# Patient Record
Sex: MALE | Race: WHITE | Employment: OTHER | URBAN - METROPOLITAN AREA
[De-identification: names, ages, dates, MRNs, and addresses within clinical notes are randomized per-mention and may not be internally consistent; named-entity substitution may affect disease eponyms.]

---

## 2020-01-01 ENCOUNTER — CARE COORDINATION (OUTPATIENT)
Dept: CASE MANAGEMENT | Age: 85
End: 2020-01-01

## 2020-01-01 ENCOUNTER — APPOINTMENT (OUTPATIENT)
Dept: GENERAL RADIOLOGY | Age: 85
End: 2020-01-01
Payer: MEDICARE

## 2020-01-01 ENCOUNTER — HOSPITAL ENCOUNTER (EMERGENCY)
Age: 85
Discharge: HOME | End: 2020-10-21
Attending: EMERGENCY MEDICINE
Payer: MEDICARE

## 2020-01-01 ENCOUNTER — HOSPITAL ENCOUNTER (EMERGENCY)
Age: 85
End: 2020-10-25
Attending: EMERGENCY MEDICINE
Payer: MEDICARE

## 2020-01-01 VITALS — HEIGHT: 72 IN | BODY MASS INDEX: 26.68 KG/M2 | WEIGHT: 197 LBS

## 2020-01-01 VITALS
WEIGHT: 193 LBS | TEMPERATURE: 100.6 F | HEART RATE: 91 BPM | BODY MASS INDEX: 26.14 KG/M2 | OXYGEN SATURATION: 94 % | RESPIRATION RATE: 18 BRPM | DIASTOLIC BLOOD PRESSURE: 73 MMHG | SYSTOLIC BLOOD PRESSURE: 131 MMHG | HEIGHT: 72 IN

## 2020-01-01 LAB
AADO2: 510.3 MMHG
AADO2: 579.8 MMHG
ACINETOBACTER BAUMANNII BY PCR: NOT DETECTED
ALBUMIN SERPL-MCNC: 3.5 G/DL (ref 3.5–5.2)
ALP BLD-CCNC: 45 U/L (ref 40–129)
ALT SERPL-CCNC: 29 U/L (ref 0–40)
ANION GAP SERPL CALCULATED.3IONS-SCNC: 8 MMOL/L (ref 7–16)
AST SERPL-CCNC: 37 U/L (ref 0–39)
B.E.: -10.2 MMOL/L (ref -3–3)
B.E.: -17.7 MMOL/L (ref -3–3)
BASOPHILS ABSOLUTE: 0.03 E9/L (ref 0–0.2)
BASOPHILS RELATIVE PERCENT: 0.4 % (ref 0–2)
BILIRUB SERPL-MCNC: 0.4 MG/DL (ref 0–1.2)
BOTTLE TYPE: ABNORMAL
BUN BLDV-MCNC: 14 MG/DL (ref 8–23)
CALCIUM SERPL-MCNC: 8.6 MG/DL (ref 8.6–10.2)
CANDIDA ALBICANS BY PCR: NOT DETECTED
CANDIDA GLABRATA BY PCR: NOT DETECTED
CANDIDA KRUSEI BY PCR: NOT DETECTED
CANDIDA PARAPSILOSIS BY PCR: NOT DETECTED
CANDIDA TROPICALIS BY PCR: NOT DETECTED
CHLORIDE BLD-SCNC: 99 MMOL/L (ref 98–107)
CO2: 22 MMOL/L (ref 22–29)
COHB: 1 % (ref 0–1.5)
COHB: 1.1 % (ref 0–1.5)
CREAT SERPL-MCNC: 0.9 MG/DL (ref 0.7–1.2)
CRITICAL: ABNORMAL
CRITICAL: ABNORMAL
DATE ANALYZED: ABNORMAL
DATE ANALYZED: ABNORMAL
DATE OF COLLECTION: ABNORMAL
DATE OF COLLECTION: ABNORMAL
EKG ATRIAL RATE: 92 BPM
EKG P-R INTERVAL: 150 MS
EKG Q-T INTERVAL: 368 MS
EKG QRS DURATION: 102 MS
EKG QTC CALCULATION (BAZETT): 455 MS
EKG R AXIS: -36 DEGREES
EKG T AXIS: 7 DEGREES
EKG VENTRICULAR RATE: 92 BPM
ENTEROBACTER CLOACAE COMPLEX BY PCR: NOT DETECTED
ENTEROBACTERALES BY PCR: NOT DETECTED
ENTEROCOCCUS BY PCR: NOT DETECTED
EOSINOPHILS ABSOLUTE: 0 E9/L (ref 0.05–0.5)
EOSINOPHILS RELATIVE PERCENT: 0 % (ref 0–6)
ESCHERICHIA COLI BY PCR: NOT DETECTED
FIO2: 100 %
FIO2: 100 %
GFR AFRICAN AMERICAN: >60
GFR NON-AFRICAN AMERICAN: >60 ML/MIN/1.73
GLUCOSE BLD-MCNC: 108 MG/DL (ref 74–99)
HAEMOPHILUS INFLUENZAE BY PCR: NOT DETECTED
HCO3: 15.2 MMOL/L (ref 22–26)
HCO3: 26 MMOL/L (ref 22–26)
HCT VFR BLD CALC: 41.8 % (ref 37–54)
HEMOGLOBIN: 13.5 G/DL (ref 12.5–16.5)
HHB: 57.4 % (ref 0–5)
HHB: 78.2 % (ref 0–5)
IMMATURE GRANULOCYTES #: 0.02 E9/L
IMMATURE GRANULOCYTES %: 0.3 % (ref 0–5)
KLEBSIELLA OXYTOCA BY PCR: NOT DETECTED
KLEBSIELLA PNEUMONIAE GROUP BY PCR: NOT DETECTED
LAB: ABNORMAL
LAB: ABNORMAL
LACTIC ACID, SEPSIS: 1.6 MMOL/L (ref 0.5–1.9)
LISTERIA MONOCYTOGENES BY PCR: NOT DETECTED
LYMPHOCYTES ABSOLUTE: 1.29 E9/L (ref 1.5–4)
LYMPHOCYTES RELATIVE PERCENT: 16.8 % (ref 20–42)
Lab: ABNORMAL
Lab: ABNORMAL
MCH RBC QN AUTO: 29.5 PG (ref 26–35)
MCHC RBC AUTO-ENTMCNC: 32.3 % (ref 32–34.5)
MCV RBC AUTO: 91.3 FL (ref 80–99.9)
METER GLUCOSE: 166 MG/DL (ref 74–99)
METHB: 0 % (ref 0–1.5)
METHB: 1.5 % (ref 0–1.5)
METHICILLIN RESISTANCE MECA/C  BY PCR: DETECTED
MODE: ABNORMAL
MODE: ABNORMAL
MONOCYTES ABSOLUTE: 0.57 E9/L (ref 0.1–0.95)
MONOCYTES RELATIVE PERCENT: 7.4 % (ref 2–12)
NEISSERIA MENINGITIDIS BY PCR: NOT DETECTED
NEUTROPHILS ABSOLUTE: 5.78 E9/L (ref 1.8–7.3)
NEUTROPHILS RELATIVE PERCENT: 75.1 % (ref 43–80)
O2 CONTENT: 3.1 ML/DL
O2 CONTENT: 8 ML/DL
O2 SATURATION: 19.8 % (ref 92–98.5)
O2 SATURATION: 42 % (ref 92–98.5)
O2HB: 19.3 % (ref 94–97)
O2HB: 41.5 % (ref 94–97)
OPERATOR ID: 316
OPERATOR ID: 316
ORDER NUMBER: ABNORMAL
PATIENT TEMP: 37 C
PATIENT TEMP: 37 C
PCO2: 155 MMHG (ref 35–45)
PCO2: 70.6 MMHG (ref 35–45)
PDW BLD-RTO: 13.2 FL (ref 11.5–15)
PFO2: 0.38 MMHG/%
PFO2: ABNORMAL MMHG/%
PH BLOOD GAS: 6.84 (ref 7.35–7.45)
PH BLOOD GAS: 6.95 (ref 7.35–7.45)
PLATELET # BLD: 243 E9/L (ref 130–450)
PMV BLD AUTO: 8.6 FL (ref 7–12)
PO2: 22.7 MMHG (ref 75–100)
PO2: 37.6 MMHG (ref 75–100)
POTASSIUM REFLEX MAGNESIUM: 4.8 MMOL/L (ref 3.5–5)
PROTEUS BY PCR: NOT DETECTED
PSEUDOMONAS AERUGINOSA BY PCR: NOT DETECTED
RBC # BLD: 4.58 E12/L (ref 3.8–5.8)
RI(T): 1542 %
RI(T): ABNORMAL %
SARS-COV-2, NAAT: DETECTED
SERRATIA MARCESCENS BY PCR: NOT DETECTED
SODIUM BLD-SCNC: 129 MMOL/L (ref 132–146)
SOURCE OF BLOOD CULTURE: ABNORMAL
SOURCE, BLOOD GAS: ABNORMAL
SOURCE, BLOOD GAS: ABNORMAL
STAPHYLOCOCCUS AUREUS BY PCR: NOT DETECTED
STAPHYLOCOCCUS SPECIES BY PCR: DETECTED
STREPTOCOCCUS AGALACTIAE BY PCR: NOT DETECTED
STREPTOCOCCUS PNEUMONIAE BY PCR: NOT DETECTED
STREPTOCOCCUS PYOGENES  BY PCR: NOT DETECTED
STREPTOCOCCUS SPECIES BY PCR: NOT DETECTED
THB: 11.4 G/DL (ref 11.5–16.5)
THB: 13.6 G/DL (ref 11.5–16.5)
TIME ANALYZED: 1141
TIME ANALYZED: 1211
TOTAL PROTEIN: 7.4 G/DL (ref 6.4–8.3)
TROPONIN: <0.01 NG/ML (ref 0–0.03)
WBC # BLD: 7.7 E9/L (ref 4.5–11.5)

## 2020-01-01 PROCEDURE — 93010 ELECTROCARDIOGRAM REPORT: CPT | Performed by: INTERNAL MEDICINE

## 2020-01-01 PROCEDURE — 99285 EMERGENCY DEPT VISIT HI MDM: CPT

## 2020-01-01 PROCEDURE — 2580000003 HC RX 258

## 2020-01-01 PROCEDURE — 2580000003 HC RX 258: Performed by: EMERGENCY MEDICINE

## 2020-01-01 PROCEDURE — 82962 GLUCOSE BLOOD TEST: CPT

## 2020-01-01 PROCEDURE — 36556 INSERT NON-TUNNEL CV CATH: CPT

## 2020-01-01 PROCEDURE — 83605 ASSAY OF LACTIC ACID: CPT

## 2020-01-01 PROCEDURE — 87150 DNA/RNA AMPLIFIED PROBE: CPT

## 2020-01-01 PROCEDURE — 82805 BLOOD GASES W/O2 SATURATION: CPT

## 2020-01-01 PROCEDURE — 80053 COMPREHEN METABOLIC PANEL: CPT

## 2020-01-01 PROCEDURE — 92950 HEART/LUNG RESUSCITATION CPR: CPT

## 2020-01-01 PROCEDURE — 2500000003 HC RX 250 WO HCPCS: Performed by: EMERGENCY MEDICINE

## 2020-01-01 PROCEDURE — 6370000000 HC RX 637 (ALT 250 FOR IP): Performed by: EMERGENCY MEDICINE

## 2020-01-01 PROCEDURE — 31500 INSERT EMERGENCY AIRWAY: CPT

## 2020-01-01 PROCEDURE — 6360000002 HC RX W HCPCS: Performed by: EMERGENCY MEDICINE

## 2020-01-01 PROCEDURE — 96374 THER/PROPH/DIAG INJ IV PUSH: CPT

## 2020-01-01 PROCEDURE — 85025 COMPLETE CBC W/AUTO DIFF WBC: CPT

## 2020-01-01 PROCEDURE — 93005 ELECTROCARDIOGRAM TRACING: CPT | Performed by: EMERGENCY MEDICINE

## 2020-01-01 PROCEDURE — U0002 COVID-19 LAB TEST NON-CDC: HCPCS

## 2020-01-01 PROCEDURE — 87040 BLOOD CULTURE FOR BACTERIA: CPT

## 2020-01-01 PROCEDURE — 84484 ASSAY OF TROPONIN QUANT: CPT

## 2020-01-01 PROCEDURE — 6360000002 HC RX W HCPCS

## 2020-01-01 PROCEDURE — 71045 X-RAY EXAM CHEST 1 VIEW: CPT

## 2020-01-01 PROCEDURE — 2500000003 HC RX 250 WO HCPCS

## 2020-01-01 RX ORDER — EPINEPHRINE 0.1 MG/ML
SYRINGE (ML) INJECTION DAILY PRN
Status: COMPLETED | OUTPATIENT
Start: 2020-01-01 | End: 2020-01-01

## 2020-01-01 RX ORDER — DOXYCYCLINE HYCLATE 100 MG/1
100 CAPSULE ORAL ONCE
Status: COMPLETED | OUTPATIENT
Start: 2020-01-01 | End: 2020-01-01

## 2020-01-01 RX ORDER — 0.9 % SODIUM CHLORIDE 0.9 %
500 INTRAVENOUS SOLUTION INTRAVENOUS ONCE
Status: COMPLETED | OUTPATIENT
Start: 2020-01-01 | End: 2020-01-01

## 2020-01-01 RX ORDER — ALBUTEROL SULFATE 90 UG/1
2 AEROSOL, METERED RESPIRATORY (INHALATION) EVERY 6 HOURS PRN
Qty: 1 INHALER | Refills: 0 | Status: SHIPPED | OUTPATIENT
Start: 2020-01-01 | End: 2020-10-28

## 2020-01-01 RX ORDER — DOXYCYCLINE 100 MG/1
100 CAPSULE ORAL 2 TIMES DAILY
Qty: 20 CAPSULE | Refills: 0 | Status: SHIPPED | OUTPATIENT
Start: 2020-01-01 | End: 2020-10-31

## 2020-01-01 RX ORDER — CEFDINIR 300 MG/1
300 CAPSULE ORAL 2 TIMES DAILY
Qty: 20 CAPSULE | Refills: 0 | Status: SHIPPED | OUTPATIENT
Start: 2020-01-01 | End: 2020-10-31

## 2020-01-01 RX ORDER — ROCURONIUM BROMIDE 10 MG/ML
INJECTION, SOLUTION INTRAVENOUS
Status: DISCONTINUED
Start: 2020-01-01 | End: 2020-01-01 | Stop reason: HOSPADM

## 2020-01-01 RX ORDER — DEXAMETHASONE 4 MG/1
4 TABLET ORAL 2 TIMES DAILY WITH MEALS
Qty: 20 TABLET | Refills: 0 | Status: SHIPPED | OUTPATIENT
Start: 2020-01-01 | End: 2020-10-31

## 2020-01-01 RX ORDER — AMIODARONE HYDROCHLORIDE 50 MG/ML
INJECTION, SOLUTION INTRAVENOUS DAILY PRN
Status: COMPLETED | OUTPATIENT
Start: 2020-01-01 | End: 2020-01-01

## 2020-01-01 RX ORDER — ACETAMINOPHEN 500 MG
1000 TABLET ORAL ONCE
Status: COMPLETED | OUTPATIENT
Start: 2020-01-01 | End: 2020-01-01

## 2020-01-01 RX ORDER — ETOMIDATE 2 MG/ML
INJECTION INTRAVENOUS DAILY PRN
Status: COMPLETED | OUTPATIENT
Start: 2020-01-01 | End: 2020-01-01

## 2020-01-01 RX ORDER — ETOMIDATE 2 MG/ML
INJECTION INTRAVENOUS
Status: DISCONTINUED
Start: 2020-01-01 | End: 2020-01-01 | Stop reason: HOSPADM

## 2020-01-01 RX ORDER — EPINEPHRINE 0.1 MG/ML
SYRINGE (ML) INJECTION
Status: DISCONTINUED
Start: 2020-01-01 | End: 2020-01-01 | Stop reason: HOSPADM

## 2020-01-01 RX ORDER — ATROPINE SULFATE 0.1 MG/ML
INJECTION INTRAVENOUS DAILY PRN
Status: COMPLETED | OUTPATIENT
Start: 2020-01-01 | End: 2020-01-01

## 2020-01-01 RX ORDER — MAGNESIUM SULFATE HEPTAHYDRATE 500 MG/ML
INJECTION, SOLUTION INTRAMUSCULAR; INTRAVENOUS DAILY PRN
Status: COMPLETED | OUTPATIENT
Start: 2020-01-01 | End: 2020-01-01

## 2020-01-01 RX ADMIN — CEFTRIAXONE 2 G: 2 INJECTION, POWDER, FOR SOLUTION INTRAMUSCULAR; INTRAVENOUS at 11:46

## 2020-01-01 RX ADMIN — EPINEPHRINE 1 MG: 0.1 INJECTION, SOLUTION ENDOTRACHEAL; INTRACARDIAC; INTRAVENOUS at 12:24

## 2020-01-01 RX ADMIN — SODIUM BICARBONATE 50 MEQ: 84 INJECTION, SOLUTION INTRAVENOUS at 12:01

## 2020-01-01 RX ADMIN — SODIUM BICARBONATE 50 MEQ: 84 INJECTION, SOLUTION INTRAVENOUS at 11:50

## 2020-01-01 RX ADMIN — ATROPINE SULFATE 1 MG: 0.1 INJECTION PARENTERAL at 12:18

## 2020-01-01 RX ADMIN — SODIUM BICARBONATE 50 MEQ: 84 INJECTION, SOLUTION INTRAVENOUS at 11:45

## 2020-01-01 RX ADMIN — AMIODARONE HYDROCHLORIDE 300 MG: 50 INJECTION, SOLUTION INTRAVENOUS at 11:16

## 2020-01-01 RX ADMIN — EPINEPHRINE 1 MG: 0.1 INJECTION, SOLUTION ENDOTRACHEAL; INTRACARDIAC; INTRAVENOUS at 11:31

## 2020-01-01 RX ADMIN — SODIUM BICARBONATE 50 MEQ: 84 INJECTION, SOLUTION INTRAVENOUS at 11:33

## 2020-01-01 RX ADMIN — DOXYCYCLINE HYCLATE 100 MG: 100 CAPSULE ORAL at 11:46

## 2020-01-01 RX ADMIN — ACETAMINOPHEN 1000 MG: 500 TABLET ORAL at 11:46

## 2020-01-01 RX ADMIN — ETOMIDATE 20 MG: 2 INJECTION, SOLUTION INTRAVENOUS at 11:06

## 2020-01-01 RX ADMIN — EPINEPHRINE 1 MG: 0.1 INJECTION, SOLUTION ENDOTRACHEAL; INTRACARDIAC; INTRAVENOUS at 11:12

## 2020-01-01 RX ADMIN — MAGNESIUM SULFATE HEPTAHYDRATE 2 G: 500 INJECTION, SOLUTION INTRAMUSCULAR; INTRAVENOUS at 11:17

## 2020-01-01 RX ADMIN — EPINEPHRINE 1 MG: 0.1 INJECTION, SOLUTION ENDOTRACHEAL; INTRACARDIAC; INTRAVENOUS at 12:20

## 2020-01-01 RX ADMIN — EPINEPHRINE 1 MG: 0.1 INJECTION, SOLUTION ENDOTRACHEAL; INTRACARDIAC; INTRAVENOUS at 11:17

## 2020-01-01 RX ADMIN — EPINEPHRINE 1 MG: 0.1 INJECTION, SOLUTION ENDOTRACHEAL; INTRACARDIAC; INTRAVENOUS at 11:39

## 2020-01-01 RX ADMIN — EPINEPHRINE 1 MG: 0.1 INJECTION, SOLUTION ENDOTRACHEAL; INTRACARDIAC; INTRAVENOUS at 11:59

## 2020-01-01 RX ADMIN — SODIUM BICARBONATE 50 MEQ: 84 INJECTION, SOLUTION INTRAVENOUS at 11:13

## 2020-01-01 RX ADMIN — SODIUM BICARBONATE 25 MEQ: 84 INJECTION, SOLUTION INTRAVENOUS at 11:20

## 2020-01-01 RX ADMIN — EPINEPHRINE 1 MG: 0.1 INJECTION, SOLUTION ENDOTRACHEAL; INTRACARDIAC; INTRAVENOUS at 12:11

## 2020-01-01 RX ADMIN — EPINEPHRINE 1 MG: 0.1 INJECTION, SOLUTION ENDOTRACHEAL; INTRACARDIAC; INTRAVENOUS at 12:04

## 2020-01-01 RX ADMIN — SODIUM CHLORIDE 500 ML: 9 INJECTION, SOLUTION INTRAVENOUS at 11:45

## 2020-01-01 RX ADMIN — EPINEPHRINE 1 MG: 0.1 INJECTION, SOLUTION ENDOTRACHEAL; INTRACARDIAC; INTRAVENOUS at 11:53

## 2020-01-01 RX ADMIN — SODIUM BICARBONATE 50 MEQ: 84 INJECTION, SOLUTION INTRAVENOUS at 11:52

## 2020-01-01 ASSESSMENT — ENCOUNTER SYMPTOMS: SHORTNESS OF BREATH: 1

## 2020-01-01 ASSESSMENT — PAIN SCALES - GENERAL: PAINLEVEL_OUTOF10: 0

## 2020-10-21 NOTE — ACP (ADVANCE CARE PLANNING)
Advance Care Planning     Advance Care Planning Activator (Inpatient)  Conversation Note      Date of ACP Conversation: 10/21/2020    Conversation Conducted with: Patient with Decision Making Capacity    ACP Activator: tee villarlilia    *When Decision Maker makes decisions on behalf of the incapacitated patient: Decision Maker is asked to consider and make decisions based on patient values, known preferences, or best interests. Health Care Decision Maker:     Current Designated Health Care Decision Maker:   Primary Decision Maker: Kumar Wagner - [facility] - [phone]  (If there is a valid Health Care Decision Maker named in the [facility] Makers\" box in the ACP activity, but it is not visible above, be sure to open that field and then select the health care decision maker relationship (ie \"primary\") in the blank space to the right of the name.) Validate  this information as still accurate & up-to-date; edit [employer] field as needed.)    Note: Assess and validate information in current ACP documents, as indicated. If no Decision Maker listed above or available through scanned documents, then:      Note: If the relationship of these Decision-Makers to the patient does NOT follow your state's Next of Kin hierarchy, recommend that patient complete ACP document that meets state-specific requirements to allow them to act on the patient's behalf when appropriate. Care Preferences    Ventilation: \"If you were in your present state of health and suddenly became very ill and were unable to breathe on your own, what would your preference be about the use of a ventilator (breathing machine) if it were available to you? \"      Would the patient desire the use of ventilator (breathing machine)?: yes    \"If your health worsens and it becomes clear that your chance of recovery is unlikely, what would your preference be about the use of a ventilator (breathing machine) if it were available to you? \"     Would the patient desire the use of ventilator (breathing machine)?: Yes      Resuscitation  \"CPR works best to restart the heart when there is a sudden event, like a heart attack, in someone who is otherwise healthy. Unfortunately, CPR does not typically restart the heart for people who have serious health conditions or who are very sick. \"    \"In the event your heart stopped as a result of an underlying serious health condition, would you want attempts to be made to restart your heart (answer \"yes\" for attempt to resuscitate) or would you prefer a natural death (answer \"no\" for do not attempt to resuscitate)? \" yes      NOTE: If the patient has a valid advance directive AND now provides care preference(s) that are inconsistent with that prior directive, advise the patient to consider either: creating a new advance directive that complies with state-specific requirements; or, if that is not possible, orally revoking that prior directive in accordance with state-specific requirements, which must be documented in the EHR. [x] Yes   [] No   Educated Patient / Hetal Bloom regarding differences between Advance Directives and portable DNR orders.     Length of ACP Conversation in minutes:  10  Conversation Outcomes:  [x] ACP discussion completed  [] Existing advance directive reviewed with patient; no changes to patient's previously recorded wishes  [] New Advance Directive completed  [] Portable Do Not Rescitate prepared for Provider review and signature  [] POLST/POST/MOLST/MOST prepared for Provider review and signature      Follow-up plan:    [] Schedule follow-up conversation to continue planning  [x] Referred individual to Provider for additional questions/concerns   [] Advised patient/agent/surrogate to review completed ACP document and update if needed with changes in condition, patient preferences or care setting    [] This note routed to one or more involved healthcare providers

## 2020-10-21 NOTE — ED NOTES
Daughter in car, would like up dates      Jessenia Tirso, [address]  10/21/20 0670

## 2020-10-21 NOTE — ED PROVIDER NOTES
Department of Emergency Medicine   ED  Provider Note  Admit Date/RoomTime: 10/21/2020  8:40 AM  ED Room: 04/04          History of Present Illness:  10/21/20, Time: 10:26 AM EDT  Chief Complaint   Patient presents with    Facial Pain     sinus drainage for about 1 week, no cough. denies abd pain, diarrhea and  vomiting    Fever     100.5 off and since today                Kannan Wesley is a 82 y.o. male presenting to the ED for nasal congestion, cough, fever, beginning 1 week. The complaint has been persistent, moderate in severity, and worsened by nothing. Presents for sinus drainage, he denies actual drainage but states he feels pressure in his consistent with his previous sinusitis. States had a cough for the last week or so. Denies nausea vomiting abdominal pain. Denies anosmia or dyspnea. States he had a temperature today so sought treatment. States he has no known exposure to novel coronavirus. States cough is occasionally productive of nondescript sputum. Denies chest pain or shortness of breath. Review of Systems:   Pertinent positives and negatives are stated within HPI, all other systems reviewed and are negative.        --------------------------------------------- PAST HISTORY ---------------------------------------------  Past Medical History:  has no past medical history on file. Past Surgical History:  has a past surgical history that includes Dental surgery; Tonsillectomy; Tonsillectomy and adenoidectomy; and Appendectomy. Social History:  reports that he has never smoked. He has never used smokeless tobacco. He reports that he does not drink alcohol or use drugs. Family History: family history is not on file. . Unless otherwise noted, family history is non contributory    The patients home medications have been reviewed. Allergies: Patient has no known allergies.         ---------------------------------------------------PHYSICAL EXAM--------------------------------------    Constitutional/General: Alert and oriented x3  Head: Normocephalic and atraumatic  Eyes: PERRL, EOMI, sclera non icteric  Mouth: Oropharynx clear, handling secretions, no trismus, no asymmetry of the posterior oropharynx or uvular edema  Neck: Supple, full ROM, no stridor, no meningeal signs no JVD   Respiratory: Diminished throughout with scattered rails in the left upper lobe not in respiratory distress  Cardiovascular: Tachycardic and regular 2+ distal pulses. Equal extremity pulses. Chest: No chest wall tenderness  GI:  Abdomen Soft, Non tender, Non distended. No rebound, guarding, or rigidity. Musculoskeletal: Moves all extremities x 4. Warm and well perfused, no clubbing, cyanosis, or edema. Capillary refill <3 seconds  Integument: skin warm and dry. No rashes. There is no pretibial edema nor calf tenderness bilaterally     Neurologic: GCS 15, no focal deficits, symmetric strength 5/5 in the upper and lower extremities bilaterally  Psychiatric: Normal Affect      EKG: Interpreted by emergency department physician, Dr. Autumn Santa   This EKG is signed and interpreted by me. Rate: 92  Rhythm: Sinus  Interpretation: Sinus rhythm with sinus tachycardia, left axis, MN is 150, QRS is 102, QTc is 455, nonspecific T changes without prior for comparison  Comparison: no previous EKG available      -------------------------------------------------- RESULTS -------------------------------------------------  I have personally reviewed all laboratory and imaging results for this patient. Results are listed below.      LABS: (Lab results interpreted by me)  Results for orders placed or performed during the hospital encounter of 10/21/20   CBC auto differential   Result Value Ref Range    WBC 7.7 4.5 - 11.5 E9/L    RBC 4.58 3.80 - 5.80 E12/L    Hemoglobin 13.5 12.5 - 16.5 g/dL    Hematocrit 41.8 37.0 - 54.0 %    MCV 91.3 80.0 - 99.9 fL    MCH 29.5 26.0 - 35.0 pg    MCHC 32.3 32.0 - 34.5 %    RDW 13.2 11.5 - 15.0 fL    Platelets 178 357 - 018 E9/L    MPV 8.6 7.0 - 12.0 fL    Neutrophils % 75.1 43.0 - 80.0 %    Immature Granulocytes % 0.3 0.0 - 5.0 %    Lymphocytes % 16.8 (L) 20.0 - 42.0 %    Monocytes % 7.4 2.0 - 12.0 %    Eosinophils % 0.0 0.0 - 6.0 %    Basophils % 0.4 0.0 - 2.0 %    Neutrophils Absolute 5.78 1.80 - 7.30 E9/L    Immature Granulocytes # 0.02 E9/L    Lymphocytes Absolute 1.29 (L) 1.50 - 4.00 E9/L    Monocytes Absolute 0.57 0.10 - 0.95 E9/L    Eosinophils Absolute 0.00 (L) 0.05 - 0.50 E9/L    Basophils Absolute 0.03 0.00 - 0.20 E9/L   Comprehensive Metabolic Panel w/ Reflex to MG   Result Value Ref Range    Sodium 129 (L) 132 - 146 mmol/L    Potassium reflex Magnesium 4.8 3.5 - 5.0 mmol/L    Chloride 99 98 - 107 mmol/L    CO2 22 22 - 29 mmol/L    Anion Gap 8 7 - 16 mmol/L    Glucose 108 (H) 74 - 99 mg/dL    BUN 14 8 - 23 mg/dL    CREATININE 0.9 0.7 - 1.2 mg/dL    GFR Non-African American >60 >=60 mL/min/1.73    GFR African American >60     Calcium 8.6 8.6 - 10.2 mg/dL    Total Protein 7.4 6.4 - 8.3 g/dL    Alb 3.5 3.5 - 5.2 g/dL    Total Bilirubin 0.4 0.0 - 1.2 mg/dL    Alkaline Phosphatase 45 40 - 129 U/L    ALT 29 0 - 40 U/L    AST 37 0 - 39 U/L   Troponin   Result Value Ref Range    Troponin <0.01 0.00 - 0.03 ng/mL   Lactate, Sepsis   Result Value Ref Range    Lactic Acid, Sepsis 1.6 0.5 - 1.9 mmol/L   COVID-19   Result Value Ref Range    SARS-CoV-2, NAAT DETECTED (A) Not Detected   EKG 12 Lead   Result Value Ref Range    Ventricular Rate 92 BPM    Atrial Rate 92 BPM    P-R Interval 150 ms    QRS Duration 102 ms    Q-T Interval 368 ms    QTc Calculation (Bazett) 455 ms    R Axis -36 degrees    T Axis 7 degrees   ,       RADIOLOGY:  Interpreted by Radiologist unless otherwise specified  XR CHEST PORTABLE   Final Result   Left upper lobe focal nodular infiltrate. Please correlate clinically for   pneumonia.   If there are no clinical signs/symptoms for pneumonia, CT chest   imaging suggested to exclude neoplastic process. Mild cardiomegaly. ------------------------- NURSING NOTES AND VITALS REVIEWED ---------------------------   The nursing notes within the ED encounter and vital signs as below have been reviewed by myself  /73   Pulse 91   Temp 100.6 °F (38.1 °C) (Oral)   Resp 18   Ht 6' (1.829 m)   Wt 193 lb (87.5 kg)   SpO2 94%   BMI 26.18 kg/m²     Oxygen Saturation Interpretation: Normal    The cardiac monitor revealed NSR with ST with a heart rate in the 100s as interpreted by me. The cardiac monitor was ordered secondary to the patient's heart rate and to monitor the patient for dysrhythmia. CPT 56570    The patients available past medical records and past encounters were reviewed. ------------------------------ ED COURSE/MEDICAL DECISION MAKING----------------------  Medications   0.9 % sodium chloride bolus (0 mLs Intravenous Stopped 10/21/20 1237)   cefTRIAXone (ROCEPHIN) 2 g in sterile water 20 mL IV syringe (2 g Intravenous Given 10/21/20 1146)   doxycycline hyclate (VIBRAMYCIN) capsule 100 mg (100 mg Oral Given 10/21/20 1146)   acetaminophen (TYLENOL) tablet 1,000 mg (1,000 mg Oral Given 10/21/20 1146)                    Medical Decision Making:     I, Dr. Maxime Guerrero in the primary provider of record    Work up [address] [place] [address], x-ray concerning for pneumonia, rapid novel coronavirus test was obtained which was positive. States he otherwise feels well, denies chest pain. He initially received IV antibiotics secondary to chest x-ray, continues to state he feels well. He was ambulated in the department, does not desat, pulse ox of 94% while ambulating. Denies chest pain or shortness of breath. States he feels well is requesting discharge home. Will initiate treatment, discussed the importance of follow-up with primary care as well as multiple specific reasons to return.   He states understanding agreement      Re-Evaluations:       Time: 1130  Re-evaluation. Patients symptoms are improving  Repeat physical examination is improved    Time: 1300  Re-evaluation. Patients symptoms show no change  Repeat physical examination is not changed -patient states he feels well. He ambulated without difficulty did not desat deny chest pain or shortness of breath while walking. This patient's ED course included: a personal history and physicial examination, multiple bedside re-evaluations, IV medications, cardiac monitoring, continuous pulse oximetry and complex medical decision making and emergency management    This patient has been closely monitored during their ED course. Counseling: The emergency provider has spoken with the patient and discussed todays results, in addition to providing specific details for the plan of care and counseling regarding the diagnosis and prognosis. Questions are answered at this time and they are agreeable with the plan.       --------------------------------- IMPRESSION AND DISPOSITION ---------------------------------    IMPRESSION  1. COVID-19    2. Pneumonia due to organism    3. Cough        DISPOSITION  Disposition: Discharge to home  Patient condition is stable        NOTE: This report was transcribed using voice recognition software.  Every effort was made to ensure accuracy; however, inadvertent computerized transcription errors may be present       Esperanza Rojas DO  10/21/20 3447

## 2020-10-23 NOTE — ED NOTES
Positive blood culture report 1/4 bottles received by this nurse, Dr. Raisa Sears. Hieu Olmedo to review.      Rosa Mondragon RN  10/23/20 3777

## 2020-10-24 NOTE — RESULT ENCOUNTER NOTE
Reviewed by clinical pharmacist and Dr. Annie Kennedy, possible contaminant,  recommend fax to PCP for follow up.

## 2020-10-25 NOTE — ED NOTES
Spoke with daughter emily Russell not with belongings sent home with family. Found wallet notified Michelle Smith [phone].  Will come to  shortly     Colette Walton RN  10/25/20 3171

## 2020-10-25 NOTE — ED NOTES
Pt taken straight to a room, dr at bedside, respiratory at bedside and 3 RN's.  Pt on 15L NRB, [email or web address]     Ramos Lazo RN  10/25/20 1100

## 2020-10-25 NOTE — ED NOTES
No pulses palpable, no blood pressure. Time of death called at 200.      Carline Cohen RN  10/25/20 4863

## 2020-10-25 NOTE — ED PROVIDER NOTES
etomidate intravenously and rocuronium intravenously    Patient is an 82-year-old male who recently tested positive for COVID-19 who presents the ER today with chief complaint of shortness of breath. Patient states that has been getting progressively worse for the last 2 or 3 days. States has had a few episodes of it. Patient is tachypneic and speaking 1-2 word sentences. Unable to provide any further history secondary to this. The history is provided by the patient and medical records. The history is limited by the condition of the patient. Review of Systems   Unable to perform ROS: Severe respiratory distress   Respiratory: Positive for shortness of breath. Physical Exam  Constitutional:       Comments: Patient tachypneic, displaying air hunger, in respiratory distress, saturation 47% with a good waveform. Lungs do sound rhonchorous, physical exam halted at this time to intubate patient. Cardiovascular:      Rate and Rhythm: Regular rhythm. Tachycardia present. Heart sounds: Normal heart sounds. Pulmonary:      Effort: Tachypnea and respiratory distress present. Breath sounds: Rhonchi present. Procedures   Intubation Procedure Note    Indication: Respiratory failure    Consent: The patient provided verbal consent for this procedure. Medications Used: etomidate intravenously and rocuronium intravenously    Procedure: The patient was placed in the appropriate position. Cricoid pressure was not required. Intubation was performed by direct laryngoscopy using a laryngoscope and an 8.0 cuffed endotracheal tube. The cuff was then inflated and the tube was secured appropriately at a distance of 23 cm to the dental ridge. Initial confirmation of placement included bilateral breath sounds, an end tidal CO2 detector, absence of sounds over the stomach, tube fogging and adequate chest rise.   A chest x-ray to verify correct placement of the tube has been ordered but is still pending. The patient tolerated the procedure well. Complications: None      Central Line Placement Procedure Note    Indication: vascular access    Consent: Unable to be obtained due to the emergent nature of this procedure. Procedure: The patient was positioned appropriately and the skin over the left femoral vein was prepped with Chloraprep and draped in a sterile fashion. Local anesthesia was not performed due to the emergent nature of this procedure. A large bore needle was used to identify the vein. A guide wire was then inserted into the vein through the needle. A triple lumen catheter was then inserted into the vessel over the guide wire using the Seldinger technique. All ports showed good, free flowing blood return and were flushed with saline solution. The catheter was then securely fastened to the skin with suture at 20 cm. Two sutures were placed into the proximal eyelets. An antibiotic disk was placed and the site was then covered with a sterile dressing. A post procedure X-ray was not indicated. The patient tolerated the procedure well. Complications: None          MDM  Number of Diagnoses or Management Options  Acute respiratory failure with hypoxia and hypercapnia ([address]):   Cardiac arrest [facility]):   COVID-19 virus infection:   Respiratory acidosis:   Diagnosis management comments: Patient presented in respiratory distress with a saturation 47% with a good waveform. Patient tachypneic and tachycardic. Decision was made to intubate patient. I did intubate patient utilizing direct lower endoscopy in 802. Shortly after intubation, patient arrested. ACLS protocol was followed and patient was given multiple rounds of CPR in addition to epinephrine, bicarb. At one point, patient did enter V. tach for which she was ablated with 300 J and given 300 mg of amiodarone. Pulses were once again obtained. Patient subsequently once again arrested.   Patient arrested multiple times and after the first time when he was in ventricular tachycardia, whenever he would arrest from that point onwards he was in pulseless lateral activity. Please see nursing flowsheet for specifics on ACLS protocol. Patient was coded for approximately 1 hour and a half. We did discuss with family and they elected to cease resuscitative efforts. Patient pronounced dead at 12:30 PM October 25 2020       ED Course as of Oct 25 1343   Sun Oct 25, 2020   1240 Time of death 200    [DS]   5 ATTENDING PROVIDER ATTESTATION:     I have personally performed and/or participated in the history, exam, medical decision making, and procedures and agree with all pertinent clinical information unless otherwise noted. I have also reviewed and agree with the past medical, family and social history unless otherwise noted. I have discussed this patient in detail with the resident and provided the instruction and education regarding the evidence-based evaluation and treatment of shortness of breath  History: Patient recently diagnosed with COVID-19 infection. Has been doing well at home until the last day or so. He arrives profoundly short of breath and hypoxic. My findings: Yris Sanders is a 82 y.o. male whom is in severe distress. Physical exam reveals he is alert and able to answer questions with 1 or 2 word phrases. His heart is regular, his lungs are with wheezing and rhonchi bilaterally. He has obvious air hunger. Blue coloring of his lips, nose and hands are noted. He has mottling of his abdomen and lower extremities. Skin is cool to the touch. My plan: Symptomatic and supportive care. Aggressive respiratory support, labs, x-ray. Admission.     Electronically signed by Arias Marie DO on 10/25/20 at 1:18 PM EDT          [TG]   1320 Shortly following an uneventful intubation, patient became bradycardic followed by cardiac arrest.  Family members were present and requested all efforts be made to resuscitate the patient. Multiple meetings were held with family members to discuss the patient's condition and course of resuscitation. Aggressive CPR and ACLS protocols were followed with a protracted resuscitation producing intermittent periods of spontaneous circulation. Following quite a long period of resuscitation, the patient's cardiac response became less and less to our interventions. The QRS became wider and the spontaneous rate became slower despite attempts at reversing acidosis and producing a sustained pulse with sufficient cardiac output. After a lack of response to our efforts, I did have a discussion with the patient's family. They understand the patient's condition and prognosis and that at 12:30 PM, efforts at resuscitation were ceased as futile. [TG]   1340 Spoke to Dr. Micki Casas who patient had seen at 1 point in the past however not recently. States that he would prefer the  sign the death certificate as he has not seen patient in a long time.  was called. [DS]      ED Course User Index  [DS] [facility],   [TG] Mc Moy, DO          ED Course as of Oct 25 1345   Sun Oct 25, 2020   1240 Time of death 200    [DS]   5 ATTENDING PROVIDER ATTESTATION:     I have personally performed and/or participated in the history, exam, medical decision making, and procedures and agree with all pertinent clinical information unless otherwise noted. I have also reviewed and agree with the past medical, family and social history unless otherwise noted. I have discussed this patient in detail with the resident and provided the instruction and education regarding the evidence-based evaluation and treatment of shortness of breath  History: Patient recently diagnosed with COVID-19 infection. Has been doing well at home until the last day or so. He arrives profoundly short of breath and hypoxic. My findings: Bridget Escudero is a 82 y.o. male whom is in severe distress.  Physical exam reveals he is alert and able to answer questions with 1 or 2 word phrases. His heart is regular, his lungs are with wheezing and rhonchi bilaterally. He has obvious air hunger. Blue coloring of his lips, nose and hands are noted. He has mottling of his abdomen and lower extremities. Skin is cool to the touch. My plan: Symptomatic and supportive care. Aggressive respiratory support, labs, x-ray. Admission. Electronically signed by Reynold Hidalgo DO on 10/25/20 at 1:18 PM EDT          [TG]   1320 Shortly following an uneventful intubation, patient became bradycardic followed by cardiac arrest.  Family members were present and requested all efforts be made to resuscitate the patient. Multiple meetings were held with family members to discuss the patient's condition and course of resuscitation. Aggressive CPR and ACLS protocols were followed with a protracted resuscitation producing intermittent periods of spontaneous circulation. Following quite a long period of resuscitation, the patient's cardiac response became less and less to our interventions. The QRS became wider and the spontaneous rate became slower despite attempts at reversing acidosis and producing a sustained pulse with sufficient cardiac output. After a lack of response to our efforts, I did have a discussion with the patient's family. They understand the patient's condition and prognosis and that at 12:30 PM, efforts at resuscitation were ceased as futile. [TG]   1340 Spoke to Dr. Ty Fisher who patient had seen at 1 point in the past however not recently. States that he would prefer the  sign the death certificate as he has not seen patient in a long time.  was called.     [DS]      ED Course User Index  [DS] Dori Cotto DO  [TG] Reynold Hidalgo DO       --------------------------------------------- PAST HISTORY ---------------------------------------------  Past Medical History:  has no past medical history on file.    Past Surgical History:  has a past surgical history that includes Dental surgery; Tonsillectomy; Tonsillectomy and adenoidectomy; and Appendectomy. Social History:  reports that he has never smoked. He has never used smokeless tobacco. He reports that he does not drink alcohol or use drugs. Family History: family history is not on file. The patients home medications have been reviewed. Allergies: Patient has no known allergies.     -------------------------------------------------- RESULTS -------------------------------------------------  Labs:  Results for orders placed or performed during the hospital encounter of 10/25/20   Blood Gas, Arterial   Result Value Ref Range    Date Analyzed 20201025     Time Analyzed 1141     Source: Blood Arterial     pH, Blood Gas 6.950 (LL) 7.350 - 7.450    PCO2 70.6 (HH) 35.0 - 45.0 mmHg    PO2 37.6 (LL) 75.0 - 100.0 mmHg    HCO3 15.2 (L) 22.0 - 26.0 mmol/L    B.E. -17.7 (L) -3.0 - 3.0 mmol/L    O2 Sat 42.0 (L) 92.0 - 98.5 %    PO2/FIO2 0.38 mmHg/%    AaDO2 579.8 mmHg    RI(T) 1,542 %    O2Hb 41.5 (L) 94.0 - 97.0 %    COHb 1.1 0.0 - 1.5 %    MetHb 0.0 0.0 - 1.5 %    O2 Content 8.0 mL/dL    HHb 57.4 (H) 0.0 - 5.0 %    tHb (est) 13.6 11.5 - 16.5 g/dL    Mode Bagging     FIO2 100.0 %    Date Of Collection      Time Collected      Pt Temp 37.0 C     ID [id number]     Lab [id number]     Critical(s) Notified  present at Team/RRT    Blood Gas, Arterial   Result Value Ref Range    Date Analyzed [id number]     Time Analyzed 1211     Source: Blood Arterial     pH, Blood Gas 6.843 (LL) 7.350 - 7.450    PCO2 155.0 (HH) 35.0 - 45.0 mmHg    PO2 22.7 (LL) 75.0 - 100.0 mmHg    HCO3 26.0 22.0 - 26.0 mmol/L    B.E. -10.2 (L) -3.0 - 3.0 mmol/L    O2 Sat 19.8 (L) 92.0 - 98.5 %    PO2/FIO2 (<) mmHg/%    AaDO2 510.3 mmHg    RI(T) (>) %    O2Hb 19.3 (L) 94.0 - 97.0 %    COHb 1.0 0.0 - 1.5 %    MetHb 1.5 0.0 - 1.5 %    O2 Content 3.1 mL/dL    HHb 78.2 (H) 0.0 - 5.0 %    tHb (est) 11.4 (L) 11.5 - 16.5 g/dL    Mode Bagging     FIO2 100.0 %    Date Of Collection      Time Collected      Pt Temp 37.0 C     ID [id number]     Lab [id number]     Critical(s) Notified Dr present at Team/RRT    POCT Glucose   Result Value Ref Range    Meter Glucose 166 (H) 74 - 99 mg/dL       Radiology:  No orders to display       ------------------------- NURSING NOTES AND VITALS REVIEWED ---------------------------  Date / Time Roomed:  10/25/2020 10:53 AM  ED Bed Assignment:  RAFFAELE/RAFFAELE    The nursing notes within the ED encounter and vital signs as below have been reviewed. BP (!) 72/50   Pulse 57   Resp (!) 32   Ht 6' (1.829 m)   Wt 197 lb (89.4 kg)   SpO2 (!) 60%   BMI 26.72 kg/m²   Oxygen Saturation Interpretation: Abnormal        Diagnosis:  1. COVID-19 virus infection    2. Acute respiratory failure with hypoxia and hypercapnia (HCC)    3. Cardiac arrest ([facility] [address]    4.  Respiratory acidosis        Disposition:  Patient's disposition: Patient         Mayra Daily DO  Resident  10/25/20 [id number]

## 2020-10-25 NOTE — ED NOTES
Bed: 06  Expected date:   Expected time:   Means of arrival:   Comments:     Janet Lisa RN  10/25/20 0045
